# Patient Record
(demographics unavailable — no encounter records)

---

## 2024-12-13 NOTE — ASSESSMENT
[FreeTextEntry1] : Ms. KEEGAN DEL TORO is a 64 year old former smoker with multiple environmental allergies is here for follow-up.  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 Path of RUL wedge revealed Minimally invasive adenocarcinoma, all margins and LN clear. -- CT chest Nov 2024 -postsurgical changes, no concerning nodules -- repeat CT chest due in May  #allergies -well-controlled on Singulair, worsening symptoms when tried stopping -- c/w Singulair  #Former smoker - 20 pack year hx, quit 2014 -- Pulmonary function testing December 2024 with no evidence of obstructive defect, normal lung volumes, normal DLCO   All questions answered. Patient in agreement with plan.  Follow up in 6mo or sooner if needed.

## 2024-12-13 NOTE — CONSULT LETTER
[Dear  ___] : Dear  [unfilled], [Consult Letter:] : I had the pleasure of evaluating your patient, [unfilled]. [Please see my note below.] : Please see my note below. [Consult Closing:] : Thank you very much for allowing me to participate in the care of this patient.  If you have any questions, please do not hesitate to contact me. [FreeTextEntry3] : Sincerely,\par  \par  Smitha Davenport MD\par  [facility] Physician [facility]\par  Pulmonary Medicine\par  tel: [phone]\par  fax: [phone]\par

## 2024-12-13 NOTE — HISTORY OF PRESENT ILLNESS
[Former] : former [Never] : never [TextBox_4] : Ms. KEEGAN DEL TORO is a 64 year old former smoker with multiple environmental allergies is here for follow-up  History Reports hx of asthma, diagnosed in her 40s. Was on inhalers for some time. Currently maintained on Singulair only (tried stopping with worsening sx) Denies any exacerbations/hospitalizations.  Reports chronic allergies, notes persistent clear phlegm. Takes anti-histamines.  Interval hx: Last seen 2023.  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  s/p Right VATS R.A., RUL wedge resection, RLL wedge resection, MLND on 2023 with Dr MAHAJAN.  Path of RUL wedge revealed Minimally invasive adenocarcinoma, 1cm with invasive acinar pattern spanning 0.45cm, G1, all margins and LNs (0/4) are negative, pT1mi pN0  doing well. no cough, no SOB, physically active compliant with singulair   PMH: none Meds: Singulair, anti-histamine, vitamins, Ca All: NKDA SH: former smoker FH: sister - pancreatic ca; brother  of colon ca; hx of HTN; denies hx of pulmonary issues in family PMD: Dr Ca Immunizations: COVID vaccinated. - Pfizer 21; 21.

## 2025-06-19 NOTE — CONSULT LETTER
After Visit Summary   7/5/2018    Ravi Stanley    MRN: [id number]           Patient Information     Date Of Birth          1958        Visit Information        Provider Department      7/5/2018 2:00 PM Farzana Guzman [facility]        Today's Diagnoses     Post corneal transplant - Left Eye        Advanced stage glaucoma - Left Eye          Care Instructions    predforte (PINK CAP) EVERY 2 HOURS in left eye  Ofloxacin (TAN CAP) four times a day  In LEFT EYE  GEL ARTIFICIAL TEARS every hour in LEFT EYE   CONTINUE PRESSURE DROPS (PURPLE, TEAL/GREEN)    RETURN FOR APPOINTMENT with Dr. Roche on Monday earlier as needed with Dr. Sanabria          Follow-ups after your visit        Follow-up notes from your care team     Return for Follow Up with Dr. Roche monday as scheduled or earlier if symptoms worsen .      Your next 10 appointments already scheduled     Jul 09, 2018  8:30 AM CDT   RETURN CORNEA with Domingo Roche MD   Eye Clinic ([facility])    [address]  [facility] [id number] [phone]              Who to contact     Please call your clinic at [phone] to:    Ask questions about your health    Make or cancel appointments    Discuss your medicines    Learn about your test results    Speak to your doctor            Additional Information About Your Visit        Care EveryWhere ID     This is your Care EveryWhere ID. This could be used by other organizations to access your [facility] medical records  [id number]         Blood Pressure from Last 3 Encounters:   03/22/18 121/69   02/20/18 129/74   01/18/18 137/78    Weight from Last 3 Encounters:   03/22/18 92.5 kg (204 lb)   03/06/18 91.6 kg (202 lb)   02/20/18 86.6 kg (191 lb)              Today, you had the following     No orders found for display         Today's Medication Changes          These changes are accurate as of 7/5/18  3:16 PM.  If you have any questions, ask  [Dear  ___] : Dear  [unfilled], your nurse or doctor.               These medicines have changed or have updated prescriptions.        Dose/Directions    ofloxacin 0.3 % ophthalmic solution   Commonly known as:  OCUFLOX   This may have changed:  Another medication with the same name was removed. Continue taking this medication, and follow the directions you see here.   Used for:  Post corneal transplant   Changed by:  Farzana Guzman MD        Dose:  1-2 drop   Place 1-2 drops Into the left eye 2 times daily   Quantity:  1 Bottle   Refills:  11         Stop taking these medicines if you haven't already. Please contact your care team if you have questions.     tobramycin 15mg/ml in hypromellose 0.3% cmpd ophthalmic solution   Commonly known as:  TOBREX   Stopped by:  Farzana Guzman MD           vancomycin 25 mg/mL in hypromellose 0.3% cmpd ophthalmic solution   Commonly known as:  VANCOCIN   Stopped by:  Farzana Guzman MD                Where to get your medicines      These medications were sent to [facility] [address] [place] [address] [address] [place] [address]  [address] [facility] [address]     Phone:  [phone]     ofloxacin 0.3 % ophthalmic solution    prednisoLONE acetate 1 % ophthalmic susp                Primary Care Provider Office Phone # Fax #    Bal Garza [phone] [address] [facility] [address]        Equal Access to Services     DEBRA ZURITA AH: Hadii howie ewing hadasho Soclementali, waaxda luqadaha, qaybta kaalmada alvino, mike zapata. So [facility] [phone].    ATENCIÓN: Si habla español, tiene a ron disposición servicios gratuitos de asistencia lingüística. Love al [phone].    We comply with applicable federal civil rights laws and [place] laws. We do not discriminate on the basis of race, color, national origin, age, disability, sex, sexual orientation, or gender identity.            Thank you!     Thank you for choosing  EYE CLINIC  for your care. Our goal is always to provide you with excellent care. Hearing back from our patients is one way we can continue to improve our services. Please take a few minutes to complete the written survey that you may receive in the mail after your visit with us. Thank you!             Your Updated Medication List - Protect others around you: Learn how to safely use, store and throw away your medicines at www.disposemymeds.org.          This list is accurate as of 7/5/18  3:16 PM.  Always use your most recent med list.                   Brand Name Dispense Instructions for use Diagnosis    aspirin 81 MG tablet      Take 1 tablet by mouth daily.        atorvastatin 40 MG tablet    LIPITOR    90 tablet    Take 1 tablet (40 mg) by mouth daily    Coronary artery disease due to lipid rich plaque, Status post coronary angioplasty       blood glucose lancets standard    no brand specified    1 Box    Use to test blood sugar 1 times daily or as directed.    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, unspecified laterality, unspecified long term insulin use status, unspecified retinopathy severity (H)       blood glucose monitoring test strip    no brand specified    100 strip    Use to test blood sugars 2 x a day    Type 2 diabetes mellitus with hyperglycemia, without long-term current use of insulin (H)       brimonidine 0.2 % ophthalmic solution    ALPHAGAN    15 mL    1 drop to left eye 3 times daily: 1 drop to right eye 2 times daily    Post corneal transplant       carboxymethylcellulose 0.5 % Soln ophthalmic solution    REFRESH PLUS    30 each    Place 1 drop Into the left eye 4 times daily    Post corneal transplant       cholecalciferol 1000 UNIT tablet    vitamin D3    100 tablet    Take 1 tablet by mouth 2 times daily.    Vitamin D deficiency       continuous blood glucose monitoring sensor     3 each    For use with Freestyle [place] Flash  for continuous monitioring of  [Consult Letter:] : I had the pleasure of evaluating your patient, [unfilled]. [Please see my note below.] : Please see my note below. blood glucose levels. Replace sensor every 10 days.    Type 2 diabetes mellitus with hyperglycemia, without long-term current use of insulin (H)       dorzolamide-timolol 2-0.5 % ophthalmic solution    COSOPT    1 Bottle    Place 1 drop into both eyes 2 times daily    Primary open angle glaucoma of both eyes, severe stage       fluticasone 50 MCG/ACT spray    FLONASE    1 Bottle    Spray 1-2 sprays into both nostrils daily    Gustatory rhinitis       gentamicin 0.3 % ophthalmic solution    GARAMYCIN    5 mL    Place 1 drop Into the left eye 2 times daily    Post corneal transplant, Cornea ulcer, left, Primary open angle glaucoma of both eyes, severe stage       glipiZIDE 10 MG tablet    GLUCOTROL    60 tablet    Take 1 tablet (10 mg) by mouth 2 times daily (before meals) Due for office visit    Type 2 diabetes mellitus with hyperglycemia, without long-term current use of insulin (H)       latanoprost 0.005 % ophthalmic solution    XALATAN    1 Bottle    Place 1 drop into both eyes At Bedtime    Glaucomatous atrophy (cupping) of optic disc, bilateral       lisinopril 5 MG tablet    PRINIVIL/ZESTRIL    90 tablet    Take 1 tablet (5 mg) by mouth daily    Type 2 diabetes mellitus with complication, without long-term current use of insulin (H)       loteprednol 0.5 % ophthalmic susp    LOTEMAX    1 Bottle    Place 1 drop Into the left eye 2 times daily    Post corneal transplant       metFORMIN 1000 MG tablet    GLUCOPHAGE    180 tablet    Take 1 tablet (1,000 mg) by mouth 2 times daily (with meals)    Type 2 diabetes mellitus with complication, without long-term current use of insulin (H)       methocarbamol 500 MG tablet    ROBAXIN    30 tablet    Take 2 tablets (1,000 mg) by mouth 3 times daily as needed for muscle spasms    Tension headache       mirabegron 25 MG 24 hr tablet    MYRBETRIQ    30 tablet    Take 1 tablet (25 mg) by mouth daily    Overactive bladder       * Mouthwash/Gargle Liqd     1 Bottle    Swish and  [Consult Closing:] : Thank you very much for allowing me to participate in the care of this patient.  If you have any questions, please do not hesitate to contact me. swallow 10 ml daily before bedtime.    Taste disorder, secondary, salt       * artificial saliva Liqd liquid     237 mL    Swish and spit 15 mLs in mouth 4 times daily    Dry mouth       ofloxacin 0.3 % ophthalmic solution    OCUFLOX    1 Bottle    Place 1-2 drops Into the left eye 2 times daily    Post corneal transplant       omeprazole 20 MG tablet     90 tablet    Take 1 tablet (20 mg) by mouth daily Take 30-60 minutes before a meal.    Dyspepsia       * order for DME     1 Units    Equipment being ordered: home blood pressure device    Type 2 diabetes mellitus with diabetic retinopathy and macular edema, unspecified retinopathy severity       * order for DME     1 each    Equipment being ordered: bp monitor    Type 2 diabetes mellitus with hyperglycemia, without long-term current use of insulin (H)       oxybutynin 5 MG tablet    DITROPAN    60 tablet    Take 1-2 tablets (5-10 mg) by mouth nightly as needed for bladder spasms    Nocturia       pramoxine 1 % Lotn lotion    SARNA SENSITIVE    237 mL    Place rectally 3 times daily    Itchy skin       prednisoLONE acetate 1 % ophthalmic susp    PRED FORTE    1 Bottle    Place 1-2 drops Into the left eye daily    Post corneal transplant, Advanced stage glaucoma       Psyllium 55.46 % Powd     1 Bottle    1-2 teaspoonfuls with glass of water daily.    Irritable bowel syndrome without diarrhea       Simethicone 180 MG Caps     270 capsule    1 capsule 3 times a day with the Acarbose.    Dyspepsia       simvastatin 40 MG tablet    ZOCOR    30 tablet    Take 1 tablet (40 mg) by mouth At Bedtime    Hyperlipidemia LDL goal <100       sitagliptin 100 MG tablet    JANUVIA    90 tablet    Take 1 tablet (100 mg) by mouth daily    Type 2 diabetes mellitus with complication, without long-term current use of insulin (H)       sodium chloride 5 % ophthalmic solution        15 mL    PLACE ONE DROP INTO THE LEFT EYE FOUR TIMES DAILY    Corneal edema, Failed corneal  [FreeTextEntry3] : Sincerely,\par  \par  Smitha Davenport MD\par  [facility] Physician [facility]\par  Pulmonary Medicine\par  tel: [phone]\par  fax: [phone]\par   transplant       tadalafil 20 MG tablet    CIALIS    12 tablet    Take 1 tablet (20 mg) by mouth daily as needed prior to sex. Do not use with nitroglycerin, terazosin or doxazosin.    Male impotence       tamsulosin 0.4 MG capsule    FLOMAX    90 capsule    Take 1 capsule (0.4 mg) by mouth daily    Screening for prostate cancer       ticagrelor 90 MG tablet    BRILINTA    180 tablet    Take 1 tablet (90 mg) by mouth 2 times daily Start this evening.    Coronary artery disease due to lipid rich plaque, Status post coronary angioplasty       TRAVATAN OP      Apply 1 drop to eye At Bedtime Both eyes        * Notice:  This list has 4 medication(s) that are the same as other medications prescribed for you. Read the directions carefully, and ask your doctor or other care provider to review them with you.

## 2025-06-19 NOTE — ASSESSMENT
[FreeTextEntry1] : Ms. KEEGAN DEL TORO is a 64 year old former smoker with multiple environmental allergies is here for follow-up.  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 Path of RUL wedge revealed Minimally invasive adenocarcinoma, all margins and LN clear. -- CT chest Nov 2024 -postsurgical changes, no concerning nodules --CT chest May 2025 - stable. No concerning nodules. Incidental findings of hepatic hypodensities (likely cysts),  Calcification tail of pancreas, unchanged. Stable nodular contour body tail pancreas unchanged from November 2023. (pt will discuss with PMD, will let me know if needs GI referral)  1.4 cm midline thyroid nodule (follows with endocrine)   - were discussed with patient.  -- repeat ct chest in 6mo  #allergies -well-controlled on Singulair, worsening symptoms when tried stopping -- c/w Singulair  #Former smoker - 20 pack year hx, quit 2014 -- Pulmonary function testing December 2024 with no evidence of obstructive defect, normal lung volumes, normal DLCO   All questions answered. Patient in agreement with plan.  Follow up in 6mo or sooner if needed.

## 2025-06-19 NOTE — HISTORY OF PRESENT ILLNESS
[Home] : at home, [unfilled] , at the time of the visit. [Medical Office: ([facility])___] : at the medical office located in  [Telehealth (audio & video)] : This visit was provided via telehealth using real-time 2-way audio visual technology. [Verbal consent obtained from patient] : the patient, [unfilled] [Former] : former [Never] : never [TextBox_4] : Ms. KEEGAN DEL TORO is a 64 year old former smoker with multiple environmental allergies is here for follow-up  History Reports hx of asthma, diagnosed in her 40s. Was on inhalers for some time. Currently maintained on Singulair only (tried stopping with worsening sx) Denies any exacerbations/hospitalizations.  Reports chronic allergies, notes persistent clear phlegm. Takes anti-histamines.  Interval hx: 2024: Last seen 2023.  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  s/p Right VATS R.A., RUL wedge resection, RLL wedge resection, MLND on 2023 with Dr MAHAJAN.  Path of RUL wedge revealed Minimally invasive adenocarcinoma, 1cm with invasive acinar pattern spanning 0.45cm, G1, all margins and LNs (0/4) are negative, pT1mi pN0 doing well. no cough, no SOB, physically active. compliant with singulair  2025: TEB to discuss CT chest results.     PMH: none Meds: Singulair, anti-histamine, vitamins, Ca All: NKDA SH: former smoker FH: sister - pancreatic ca; brother  of colon ca; hx of HTN; denies hx of pulmonary issues in family PMD: Dr Ca Immunizations: COVID vaccinated. - Pfizer 21; 21.

## 2025-07-19 NOTE — HISTORY OF PRESENT ILLNESS
[FreeTextEntry1] :  Chief complaint: Abdominal pain, abnormal pancreas on imaging   HPI:  Patient presents for Gastroenterology evaluation because of multiple complex Gastrointestinal issues.  As part of this complex Gastrointestinal evaluation, I extensively reviewed prior medical records on the patient including laboratory reports, medical imaging reports, and subspecialty consultations. Patient with intermittent abdominal pain which she describes as intermittent visceral periumbilical epigastric discomfort which has no exacerbating or ameliorating factors.  She has frequent dyspepsia and indigestion.  Multiple reports were reviewed with patient including endoscopic and intestinal endoscopy reports as well as histopathology. Patient verbalized understanding. I reviewed multiple reports including images and report from her ct scan of the thorax  Computed tomography scan of abdomen and pelvis with enhancing contrast was ordered for the patient. New ct scan, pancreas protocol requested.  Laboratory tests were ordered for the patient to help clarify the diagnosis. Bloodwork ordered for ca 19-9. Patient's sister had a Whipple operation. Medications, allergies, and problem list were reviewed and reconciled.  As part of this complex Gastrointestinal evaluation, I extensively reviewed prior medical records on the patient including laboratory reports, medical imaging reports, and subspecialty consultations.

## 2025-07-19 NOTE — PHYSICAL EXAM

## 2025-07-19 NOTE — ASSESSMENT
[FreeTextEntry1] :  ASSESSMENT Abnormal imaging of pancreas seen on chest ct Possible dilation of pancreatic duct   PLAN  Computed tomography scan of abdomen and pelvis with enhancing contrast was ordered for the patient. Pancreas protocol requested.    Laboratory tests were ordered for the patient to help clarify the diagnosis. Bloodwork for ca 19-9 antigen ordered.  New rx for omeprazole sent to pharmacy.  As part of this complex Gastrointestinal evaluation, I extensively reviewed prior medical records on the patient including laboratory reports, medical imaging reports, and subspecialty consultations.  No sign of acute gi bleeding such as hematemesis, melena or hematochezia. No dyspepsia, dysphagia or odynophagia. Medications, allergies, and problem list were reviewed and reconciled.